# Patient Record
Sex: MALE | Race: BLACK OR AFRICAN AMERICAN | URBAN - METROPOLITAN AREA
[De-identification: names, ages, dates, MRNs, and addresses within clinical notes are randomized per-mention and may not be internally consistent; named-entity substitution may affect disease eponyms.]

---

## 2017-02-09 DIAGNOSIS — I10 ESSENTIAL HYPERTENSION WITH GOAL BLOOD PRESSURE LESS THAN 130/80: ICD-10-CM

## 2017-02-09 RX ORDER — AMLODIPINE BESYLATE 2.5 MG/1
TABLET ORAL
Qty: 90 TAB | Refills: 0 | Status: SHIPPED | OUTPATIENT
Start: 2017-02-09 | End: 2017-05-28 | Stop reason: SDUPTHER

## 2017-04-12 DIAGNOSIS — I10 ESSENTIAL HYPERTENSION WITH GOAL BLOOD PRESSURE LESS THAN 130/80: ICD-10-CM

## 2017-04-13 RX ORDER — LISINOPRIL AND HYDROCHLOROTHIAZIDE 12.5; 2 MG/1; MG/1
2 TABLET ORAL 2 TIMES DAILY
Qty: 180 TAB | Refills: 0 | Status: SHIPPED | OUTPATIENT
Start: 2017-04-13 | End: 2017-07-31 | Stop reason: CLARIF

## 2017-07-31 ENCOUNTER — OFFICE VISIT (OUTPATIENT)
Dept: INTERNAL MEDICINE CLINIC | Age: 47
End: 2017-07-31

## 2017-07-31 VITALS
WEIGHT: 225.13 LBS | HEIGHT: 70 IN | SYSTOLIC BLOOD PRESSURE: 131 MMHG | BODY MASS INDEX: 32.23 KG/M2 | TEMPERATURE: 98.3 F | RESPIRATION RATE: 18 BRPM | DIASTOLIC BLOOD PRESSURE: 87 MMHG | OXYGEN SATURATION: 98 % | HEART RATE: 65 BPM

## 2017-07-31 DIAGNOSIS — R73.9 ELEVATED BLOOD SUGAR: Primary | ICD-10-CM

## 2017-07-31 DIAGNOSIS — E78.00 ELEVATED CHOLESTEROL: ICD-10-CM

## 2017-07-31 DIAGNOSIS — Z12.5 PROSTATE CANCER SCREENING: ICD-10-CM

## 2017-07-31 DIAGNOSIS — K64.0 FIRST DEGREE HEMORRHOIDS: ICD-10-CM

## 2017-07-31 DIAGNOSIS — I10 ESSENTIAL HYPERTENSION: ICD-10-CM

## 2017-07-31 LAB — HBA1C MFR BLD HPLC: 5.9 %

## 2017-07-31 RX ORDER — HYDROCORTISONE 25 MG/G
CREAM TOPICAL 2 TIMES DAILY
Qty: 30 G | Refills: 3 | Status: SHIPPED | OUTPATIENT
Start: 2017-07-31 | End: 2018-08-08 | Stop reason: SDUPTHER

## 2017-07-31 RX ORDER — ROSUVASTATIN CALCIUM 5 MG/1
5 TABLET, COATED ORAL
Qty: 30 TAB | Refills: 2 | Status: SHIPPED | OUTPATIENT
Start: 2017-07-31 | End: 2017-10-28 | Stop reason: SDUPTHER

## 2017-07-31 RX ORDER — LISINOPRIL AND HYDROCHLOROTHIAZIDE 10; 12.5 MG/1; MG/1
2 TABLET ORAL DAILY
Qty: 60 TAB | Refills: 5 | Status: SHIPPED | OUTPATIENT
Start: 2017-07-31 | End: 2017-09-25 | Stop reason: ALTCHOICE

## 2017-07-31 RX ORDER — LISINOPRIL AND HYDROCHLOROTHIAZIDE 10; 12.5 MG/1; MG/1
2 TABLET ORAL DAILY
COMMUNITY
End: 2017-07-31 | Stop reason: SDUPTHER

## 2017-07-31 NOTE — PROGRESS NOTES
HISTORY OF PRESENT ILLNESS  Sotero Andersen is a 51 y.o. male. HPI  Hypertension:  Sotero Andersen is a 51 y.o. male with hypertension. without Chronic kidney disease stage    Medication change since last visit: No  The patient reports:  taking medications as instructed, no medication side effects noted, patient does not perform home BP monitoring, no chest pain on exertion, no dyspnea on exertion, no swelling of ankles, no orthostatic dizziness or lightheadedness, no palpitations. Lifestyle modification/social history: generally follows a low fat low cholesterol diet, generally follows a low sodium diet, exercises sporadically, nonsmoker    Lab Results   Component Value Date/Time    Sodium 139 08/19/2016 08:40 AM    Potassium 3.8 08/19/2016 08:40 AM    Chloride 96 08/19/2016 08:40 AM    CO2 26 08/19/2016 08:40 AM    Glucose 89 08/19/2016 08:40 AM    BUN 11 08/19/2016 08:40 AM    Creatinine 1.01 08/19/2016 08:40 AM    BUN/Creatinine ratio 11 08/19/2016 08:40 AM    GFR est  08/19/2016 08:40 AM    GFR est non-AA 89 08/19/2016 08:40 AM    Calcium 9.4 08/19/2016 08:40 AM         Hyperlipidemia:  Sotero Andersen is following up on his dyslipidemia. Cardiovascular risks for him are: LDL goal is under 100  hypertension. Currently he takes  , nothing. He stopped crestor due to low back pain clearly related.   Lab Results   Component Value Date/Time    Cholesterol, total 228 08/19/2016 08:40 AM    Cholesterol, total 135 10/28/2015 08:30 AM    Cholesterol, total 171 02/10/2015 09:01 AM    Cholesterol, total 165 03/10/2014 08:23 AM    Cholesterol, total 200 09/09/2013 08:17 AM    HDL Cholesterol 32 08/19/2016 08:40 AM    HDL Cholesterol 33 10/28/2015 08:30 AM    HDL Cholesterol 33 02/10/2015 09:01 AM    HDL Cholesterol 35 03/10/2014 08:23 AM    HDL Cholesterol 34 09/09/2013 08:17 AM    LDL, calculated 165 08/19/2016 08:40 AM    LDL, calculated 85 10/28/2015 08:30 AM    LDL, calculated 115 02/10/2015 09:01 AM    LDL, calculated 112 03/10/2014 08:23 AM    LDL, calculated 139 09/09/2013 08:17 AM    Triglyceride 154 08/19/2016 08:40 AM    Triglyceride 87 10/28/2015 08:30 AM    Triglyceride 115 02/10/2015 09:01 AM    Triglyceride 88 03/10/2014 08:23 AM    Triglyceride 135 09/09/2013 08:17 AM     Lab Results   Component Value Date/Time    ALT (SGPT) 23 11/06/2014 11:51 AM    AST (SGOT) 19 11/06/2014 11:51 AM    Alk. phosphatase 89 11/06/2014 11:51 AM    Bilirubin, total <0.2 11/06/2014 11:51 AM             Prediabetes:  Daniel John is here for follow up of elevated fasting sugars and prediabetes. he has been counseled before on low carb/ low concentrated sweets diet and is following that plan. further diabetic ROS: no polyuria or polydipsia, no chest pain, dyspnea or TIAs, no numbness, tingling or pain in extremities . Lab Results   Component Value Date/Time    Glucose 89 08/19/2016 08:40 AM     Lab Results   Component Value Date/Time    Hemoglobin A1c 6.5 08/11/2015 09:00 AM    Hemoglobin A1c (POC) 5.9 07/31/2017 08:20 AM     Last Point of Care HGB A1C  Hemoglobin A1c (POC)   Date Value Ref Range Status   07/31/2017 5.9 % Final        ROS    Physical Exam   Constitutional: He appears well-developed and well-nourished. No distress. /87 (BP 1 Location: Left arm, BP Patient Position: Sitting)  Pulse 65  Temp 98.3 °F (36.8 °C) (Oral)   Resp 18  Ht 5' 10\" (1.778 m)  Wt 225 lb 2 oz (102.1 kg)  SpO2 98%  BMI 32.3 kg/m2Body mass index is 32.3 kg/(m^2). HENT:   Mouth/Throat: Oropharynx is clear and moist.   Neck: No JVD present. Carotid bruit is not present. Cardiovascular: Normal rate, regular rhythm, normal heart sounds and intact distal pulses. Pulmonary/Chest: Effort normal and breath sounds normal.   Musculoskeletal: He exhibits no edema. Neurological: He is alert. Skin: Skin is warm and dry. He is not diaphoretic. Nursing note and vitals reviewed.       ASSESSMENT and PLAN  Diagnoses and all orders for this visit:    1. Elevated blood sugar - low carb diet. Good activity. -     AMB POC HEMOGLOBIN A1C    2. Essential hypertension - Well controlled and stable. his medications were reviewed and refilled where necessary as noted below. Labs ordered as noted. -     METABOLIC PANEL, BASIC  -     lisinopril-hydroCHLOROthiazide (PRINZIDE, ZESTORETIC) 10-12.5 mg per tablet; Take 2 Tabs by mouth daily. 3. First degree hemorrhoids - needs refill on HC cream  -     hydrocortisone (HYTONE) 2.5 % topical cream; Apply  to affected area two (2) times a day. use thin layer    4. Prostate cancer screening  -     PROSTATE SPECIFIC AG    5. Elevated cholesterol - try lower dose crestor. Recheck in 2 months if tolerated. -     rosuvastatin (CRESTOR) 5 mg tablet; Take 1 Tab by mouth nightly. Follow-up Disposition:  Return in about 2 months (around 9/30/2017).

## 2017-07-31 NOTE — MR AVS SNAPSHOT
Visit Information Date & Time Provider Department Dept. Phone Encounter #  
 7/31/2017  8:15 AM Renny Blair MD [facility] [address] [id number] Follow-up Instructions Return in about 2 months (around 9/30/2017). Upcoming Health Maintenance Date Due INFLUENZA AGE 9 TO ADULT 8/1/2017 DTaP/Tdap/Td series (2 - Td) 8/8/2019 Allergies as of 7/31/2017  Review Complete On: 7/31/2017 By: Renny Blair MD  
  
 Severity Noted Reaction Type Reactions Simvastatin  11/04/2011    Myalgia Arthralgia Current Immunizations  Reviewed on 4/6/2016 Name Date Hep B Vaccine (Adult) 5/9/2016, 4/2/2016 Tdap 8/8/2009 Not reviewed this visit You Were Diagnosed With   
  
 Codes Comments Elevated blood sugar    -  Primary ICD-10-CM: R73.9 ICD-9-CM: 790.29 Essential hypertension     ICD-10-CM: I10 
ICD-9-CM: 401.9 First degree hemorrhoids     ICD-10-CM: K64.0 ICD-9-CM: 455.6 Prostate cancer screening     ICD-10-CM: Z12.5 ICD-9-CM: V76.44 Elevated cholesterol     ICD-10-CM: E78.00 ICD-9-CM: 272.0 Vitals BP Pulse Temp Resp Height(growth percentile) Weight(growth percentile) 131/87 (BP 1 Location: Left arm, BP Patient Position: Sitting) 65 98.3 °F (36.8 °C) (Oral) 18 5' 10\" (1.778 m) 225 lb 2 oz (102.1 kg) SpO2 BMI Smoking Status 98% 32.3 kg/m2 Never Smoker BMI and BSA Data Body Mass Index Body Surface Area  
 32.3 kg/m 2 2.25 m 2 Preferred Pharmacy Pharmacy Name Phone [id number] [place] [address] AT [facility] [phone] Your Updated Medication List  
  
   
This list is accurate as of: 7/31/17  8:41 AM.  Always use your most recent med list. amLODIPine 2.5 mg tablet Commonly known as:  Heber Rings TAKE ONE TABLET BY MOUTH ONCE DAILY  
  
 aspirin delayed-release 81 mg tablet Take 1 Tab by mouth daily. hydrocortisone 2.5 % topical cream  
Commonly known as:  HYTONE Apply  to affected area two (2) times a day. use thin layer  
  
 lisinopril-hydroCHLOROthiazide 10-12.5 mg per tablet Commonly known as:  Yohan Rom Take 2 Tabs by mouth daily. rosuvastatin 5 mg tablet Commonly known as:  CRESTOR Take 1 Tab by mouth nightly. Prescriptions Sent to Pharmacy Refills  
 rosuvastatin (CRESTOR) 5 mg tablet 2 Sig: Take 1 Tab by mouth nightly. Class: Normal  
 Pharmacy: [address] [place] [address] Ph #: [phone] Route: Oral  
 lisinopril-hydroCHLOROthiazide (PRINZIDE, ZESTORETIC) 10-12.5 mg per tablet 5 Sig: Take 2 Tabs by mouth daily. Class: Normal  
 Pharmacy: [address] [place] [address] Ph #: [phone] Route: Oral  
 hydrocortisone (HYTONE) 2.5 % topical cream 3 Sig: Apply  to affected area two (2) times a day. use thin layer Class: Normal  
 Pharmacy: [address] [place] [address] Ph #: [phone] Route: Topical  
  
We Performed the Following AMB POC HEMOGLOBIN A1C [30709 CPT(R)] METABOLIC PANEL, BASIC [81203 CPT(R)] PROSTATE SPECIFIC AG (PSA) [id number] CPT(R)] Follow-up Instructions Return in about 2 months (around 9/30/2017). Introducing [facility]! Dear Kamlesh Mandujano: Thank you for requesting a DataStax account. Our records indicate that you already have an active DataStax account. You can access your account anytime at [email or web address] Did you know that you can access your hospital and ER discharge instructions at any time in DataStax? You can also review all of your test results from your hospital stay or ER visit. Additional Information If you have questions, please visit the Frequently Asked Questions section of the QikServehart website at [email or web address] Remember, [email or web address] is NOT to be used for urgent needs. For medical emergencies, dial 911. Now available from your iPhone and Android! Please provide this summary of care documentation to your next provider. Your primary care clinician is listed as Emmett Mode. If you have any questions after today's visit, please call [phone].

## 2017-08-01 LAB
BUN SERPL-MCNC: 12 MG/DL (ref 6–24)
BUN/CREAT SERPL: 11 (ref 9–20)
CALCIUM SERPL-MCNC: 9.3 MG/DL (ref 8.7–10.2)
CHLORIDE SERPL-SCNC: 98 MMOL/L (ref 96–106)
CO2 SERPL-SCNC: 26 MMOL/L (ref 18–29)
CREAT SERPL-MCNC: 1.05 MG/DL (ref 0.76–1.27)
GLUCOSE SERPL-MCNC: 93 MG/DL (ref 65–99)
POTASSIUM SERPL-SCNC: 4.1 MMOL/L (ref 3.5–5.2)
PSA SERPL-MCNC: 0.2 NG/ML (ref 0–4)
SODIUM SERPL-SCNC: 138 MMOL/L (ref 134–144)

## 2017-08-02 DIAGNOSIS — I10 ESSENTIAL HYPERTENSION WITH GOAL BLOOD PRESSURE LESS THAN 130/80: ICD-10-CM

## 2017-08-03 RX ORDER — AMLODIPINE BESYLATE 2.5 MG/1
TABLET ORAL
Qty: 30 TAB | Refills: 0 | Status: SHIPPED | OUTPATIENT
Start: 2017-08-03 | End: 2017-09-07 | Stop reason: SDUPTHER

## 2017-09-07 DIAGNOSIS — I10 ESSENTIAL HYPERTENSION WITH GOAL BLOOD PRESSURE LESS THAN 130/80: ICD-10-CM

## 2017-09-07 RX ORDER — AMLODIPINE BESYLATE 2.5 MG/1
TABLET ORAL
Qty: 30 TAB | Refills: 0 | Status: SHIPPED | OUTPATIENT
Start: 2017-09-07 | End: 2017-10-11 | Stop reason: SDUPTHER

## 2017-09-07 RX ORDER — LISINOPRIL AND HYDROCHLOROTHIAZIDE 12.5; 2 MG/1; MG/1
TABLET ORAL
Qty: 180 TAB | Refills: 0 | Status: SHIPPED | OUTPATIENT
Start: 2017-09-07 | End: 2017-09-25 | Stop reason: SDUPTHER

## 2017-09-25 ENCOUNTER — OFFICE VISIT (OUTPATIENT)
Dept: INTERNAL MEDICINE CLINIC | Age: 47
End: 2017-09-25

## 2017-09-25 VITALS
HEIGHT: 70 IN | SYSTOLIC BLOOD PRESSURE: 127 MMHG | RESPIRATION RATE: 18 BRPM | OXYGEN SATURATION: 98 % | BODY MASS INDEX: 31.57 KG/M2 | DIASTOLIC BLOOD PRESSURE: 80 MMHG | HEART RATE: 65 BPM | TEMPERATURE: 98.4 F | WEIGHT: 220.5 LBS

## 2017-09-25 DIAGNOSIS — E78.00 ELEVATED CHOLESTEROL: Primary | ICD-10-CM

## 2017-09-25 NOTE — MR AVS SNAPSHOT
Visit Information Date & Time Provider Department Dept. Phone Encounter #  
 9/25/2017  8:00 AM Raudel Karimi MD [facility] [address] [id number] Follow-up Instructions Return in about 6 months (around 3/25/2018). Upcoming Health Maintenance Date Due INFLUENZA AGE 9 TO ADULT 8/1/2017 DTaP/Tdap/Td series (2 - Td) 8/8/2019 Allergies as of 9/25/2017  Review Complete On: 9/25/2017 By: Mari Gerber LPN Severity Noted Reaction Type Reactions Simvastatin  11/04/2011    Myalgia Arthralgia Current Immunizations  Reviewed on 4/6/2016 Name Date Hep B Vaccine (Adult) 5/9/2016, 4/2/2016 Tdap 8/8/2009 Not reviewed this visit You Were Diagnosed With   
  
 Codes Comments Elevated cholesterol    -  Primary ICD-10-CM: E78.00 ICD-9-CM: 272.0 Vitals BP Pulse Temp Resp Height(growth percentile) Weight(growth percentile) 127/80 (BP 1 Location: Left arm, BP Patient Position: Sitting) 65 98.4 °F (36.9 °C) (Oral) 18 5' 10\" (1.778 m) 220 lb 8 oz (100 kg) SpO2 BMI Smoking Status 98% 31.64 kg/m2 Never Smoker Vitals History BMI and BSA Data Body Mass Index Body Surface Area  
 31.64 kg/m 2 2.22 m 2 Preferred Pharmacy Pharmacy Name Phone [id number] [address] [facility] [phone] Your Updated Medication List  
  
   
This list is accurate as of: 9/25/17  8:13 AM.  Always use your most recent med list. amLODIPine 2.5 mg tablet Commonly known as:  Carthage Cordial TAKE ONE TABLET BY MOUTH ONCE DAILY **APPT IS REQUIRED WITHIN 30 DAYS, PLEASE CALL TO SCHEDULE**  
  
 aspirin delayed-release 81 mg tablet Take 1 Tab by mouth daily. hydrocortisone 2.5 % topical cream  
Commonly known as:  HYTONE Apply  to affected area two (2) times a day. use thin layer lisinopril-hydroCHLOROthiazide 20-12.5 mg per tablet Commonly known as:  Marlaine Yi Take 2 Tabs by mouth daily. rosuvastatin 5 mg tablet Commonly known as:  CRESTOR Take 1 Tab by mouth nightly. We Performed the Following HEPATIC FUNCTION PANEL [00513 CPT(R)] LIPID PANEL [06896 CPT(R)] Follow-up Instructions Return in about 6 months (around 3/25/2018). Introducing [facility] SERVICES! Dear Emmie Carroll: Thank you for requesting a Omedix account. Our records indicate that you already have an active Omedix account. You can access your account anytime at [email or web address] Loans [email or web address]. [email or web address]/D.[email or web address] Investments Loans & Services Did you know that you can access your hospital and ER discharge instructions at any time in Omedix? You can also review all of your test results from your hospital stay or ER visit. Additional Information If you have questions, please visit the Frequently Asked Questions section of the Omedix website at [email or web address] Loans & Services/. Remember, Omedix is NOT to be used for urgent needs. For medical emergencies, dial 911. Now available from your iPhone and Android! Please provide this summary of care documentation to your next provider. Your primary care clinician is listed as Aubrey Raines. If you have any questions after today's visit, please call [phone].

## 2017-09-25 NOTE — PROGRESS NOTES
HISTORY OF PRESENT ILLNESS  Nava Alves is a 51 y.o. male. HPI   Hyperlipidemia:  Nava Alves is following up on his dyslipidemia. Cardiovascular risks for him are: LDL goal is under 100  hypertension. Currently he takes  , crestor  Lab Results   Component Value Date/Time    Cholesterol, total 228 08/19/2016 08:40 AM    Cholesterol, total 135 10/28/2015 08:30 AM    Cholesterol, total 171 02/10/2015 09:01 AM    Cholesterol, total 165 03/10/2014 08:23 AM    Cholesterol, total 200 09/09/2013 08:17 AM    HDL Cholesterol 32 08/19/2016 08:40 AM    HDL Cholesterol 33 10/28/2015 08:30 AM    HDL Cholesterol 33 02/10/2015 09:01 AM    HDL Cholesterol 35 03/10/2014 08:23 AM    HDL Cholesterol 34 09/09/2013 08:17 AM    LDL, calculated 165 08/19/2016 08:40 AM    LDL, calculated 85 10/28/2015 08:30 AM    LDL, calculated 115 02/10/2015 09:01 AM    LDL, calculated 112 03/10/2014 08:23 AM    LDL, calculated 139 09/09/2013 08:17 AM    Triglyceride 154 08/19/2016 08:40 AM    Triglyceride 87 10/28/2015 08:30 AM    Triglyceride 115 02/10/2015 09:01 AM    Triglyceride 88 03/10/2014 08:23 AM    Triglyceride 135 09/09/2013 08:17 AM     Lab Results   Component Value Date/Time    ALT (SGPT) 23 11/06/2014 11:51 AM    AST (SGOT) 19 11/06/2014 11:51 AM    Alk. phosphatase 89 11/06/2014 11:51 AM    Bilirubin, total <0.2 11/06/2014 11:51 AM       Myalgias: no  Fatigue: no            ROS    Physical Exam  Visit Vitals    /80 (BP 1 Location: Left arm, BP Patient Position: Sitting)    Pulse 65    Temp 98.4 °F (36.9 °C) (Oral)    Resp 18    Ht 5' 10\" (1.778 m)    Wt 220 lb 8 oz (100 kg)    SpO2 98%    BMI 31.64 kg/m2     ASSESSMENT and PLAN  Diagnoses and all orders for this visit:    1. Elevated cholesterol - recheck on crestor.  -     LIPID PANEL  -     HEPATIC FUNCTION PANEL      Follow-up Disposition:  Return in about 6 months (around 3/25/2018).

## 2017-09-26 LAB
ALBUMIN SERPL-MCNC: 4.4 G/DL (ref 3.5–5.5)
ALP SERPL-CCNC: 93 IU/L (ref 39–117)
ALT SERPL-CCNC: 19 IU/L (ref 0–44)
AST SERPL-CCNC: 17 IU/L (ref 0–40)
BILIRUB DIRECT SERPL-MCNC: 0.06 MG/DL (ref 0–0.4)
BILIRUB SERPL-MCNC: 0.2 MG/DL (ref 0–1.2)
CHOLEST SERPL-MCNC: 158 MG/DL (ref 100–199)
HDLC SERPL-MCNC: 35 MG/DL
INTERPRETATION, 910389: NORMAL
LDLC SERPL CALC-MCNC: 101 MG/DL (ref 0–99)
PROT SERPL-MCNC: 7.4 G/DL (ref 6–8.5)
TRIGL SERPL-MCNC: 108 MG/DL (ref 0–149)
VLDLC SERPL CALC-MCNC: 22 MG/DL (ref 5–40)

## 2017-10-20 ENCOUNTER — TELEPHONE (OUTPATIENT)
Dept: INTERNAL MEDICINE CLINIC | Age: 47
End: 2017-10-20

## 2017-10-20 DIAGNOSIS — I10 ESSENTIAL HYPERTENSION WITH GOAL BLOOD PRESSURE LESS THAN 130/80: ICD-10-CM

## 2017-10-20 RX ORDER — AMLODIPINE BESYLATE 2.5 MG/1
2.5 TABLET ORAL DAILY
Qty: 90 TAB | Refills: 1 | Status: SHIPPED | OUTPATIENT
Start: 2017-10-20 | End: 2018-03-14 | Stop reason: SDUPTHER

## 2017-10-20 NOTE — TELEPHONE ENCOUNTER
PSR did not list what medication needs to be refilled for 90 day supply. Norvasc was most recent prescription filled. New prescription sent for a 90 day supply as prescribed.

## 2018-01-03 ENCOUNTER — OFFICE VISIT (OUTPATIENT)
Dept: INTERNAL MEDICINE CLINIC | Age: 48
End: 2018-01-03

## 2018-01-03 VITALS
SYSTOLIC BLOOD PRESSURE: 103 MMHG | TEMPERATURE: 100 F | DIASTOLIC BLOOD PRESSURE: 65 MMHG | HEIGHT: 70 IN | HEART RATE: 106 BPM | OXYGEN SATURATION: 95 % | RESPIRATION RATE: 18 BRPM | BODY MASS INDEX: 31.92 KG/M2 | WEIGHT: 223 LBS

## 2018-01-03 DIAGNOSIS — J06.9 UPPER RESPIRATORY TRACT INFECTION, UNSPECIFIED TYPE: Primary | ICD-10-CM

## 2018-01-03 RX ORDER — PROMETHAZINE HYDROCHLORIDE AND CODEINE PHOSPHATE 6.25; 1 MG/5ML; MG/5ML
5 SOLUTION ORAL
Qty: 118 ML | Refills: 0 | Status: SHIPPED | OUTPATIENT
Start: 2018-01-03 | End: 2018-03-26 | Stop reason: ALTCHOICE

## 2018-01-03 RX ORDER — BENZONATATE 100 MG/1
CAPSULE ORAL
COMMUNITY
Start: 2018-01-01 | End: 2018-09-26 | Stop reason: ALTCHOICE

## 2018-01-03 RX ORDER — GUAIFENESIN 600 MG/1
1200 TABLET, EXTENDED RELEASE ORAL 2 TIMES DAILY
Qty: 20 TAB | Refills: 0 | Status: SHIPPED | OUTPATIENT
Start: 2018-01-03 | End: 2018-01-08

## 2018-01-03 RX ORDER — AZITHROMYCIN 250 MG/1
TABLET, FILM COATED ORAL
COMMUNITY
Start: 2018-01-01 | End: 2018-09-26 | Stop reason: ALTCHOICE

## 2018-01-03 NOTE — MR AVS SNAPSHOT
Visit Information Date & Time Provider Department Dept. Phone Encounter #  
 1/3/2018 10:00 AM Antionette Lal MD [facility] [id number] Follow-up Instructions Return if symptoms worsen or fail to improve. Your Appointments 3/26/2018  8:00 AM  
ROUTINE CARE with Staci Yañez MD  
[facility] [address]) Appt Note: 6 month [address] [place] [id number] [phone]  
  
   
 [address] [facility] [address] Upcoming Health Maintenance Date Due DTaP/Tdap/Td series (2 - Td) 8/8/2019 Allergies as of 1/3/2018  Review Complete On: 4/9/6407 By: Wilbert Landry Severity Noted Reaction Type Reactions Simvastatin  11/04/2011    Myalgia Arthralgia Current Immunizations  Reviewed on 4/6/2016 Name Date Hep B Vaccine (Adult) 5/9/2016, 4/2/2016 Tdap 8/8/2009 Not reviewed this visit You Were Diagnosed With   
  
 Codes Comments Upper respiratory tract infection, unspecified type    -  Primary ICD-10-CM: J06.9 ICD-9-CM: 465.9 Vitals BP Pulse Temp Resp Height(growth percentile) Weight(growth percentile) 103/65 (BP 1 Location: Left arm, BP Patient Position: Sitting) (!) 106 100 °F (37.8 °C) (Oral) 18 5' 10\" (1.778 m) 223 lb (101.2 kg) SpO2 BMI Smoking Status 95% 32 kg/m2 Never Smoker Vitals History BMI and BSA Data Body Mass Index Body Surface Area 32 kg/m 2 2.24 m 2 Preferred Pharmacy Pharmacy Name Phone [id number] [address] [facility] [phone] Your Updated Medication List  
  
   
This list is accurate as of: 1/3/18 10:24 AM.  Always use your most recent med list. amLODIPine 2.5 mg tablet Commonly known as:  Rachael Sutton Take 1 Tab by mouth daily. aspirin delayed-release 81 mg tablet Take 1 Tab by mouth daily. azithromycin 250 mg tablet Commonly known as:  ZITHROMAX  
  
 benzonatate 100 mg capsule Commonly known as:  TESSALON  
  
 guaiFENesin  mg ER tablet Commonly known as:  Jose De Jesus & Jose De Jesus Take 2 Tabs by mouth two (2) times a day for 5 days. hydrocortisone 2.5 % topical cream  
Commonly known as:  HYTONE Apply  to affected area two (2) times a day. use thin layer * lisinopril-hydroCHLOROthiazide 20-12.5 mg per tablet Commonly known as:  Lenny Catherine Take 2 Tabs by mouth daily. * lisinopril-hydroCHLOROthiazide 20-12.5 mg per tablet Commonly known as:  Lenny Catherine Take 2 Tabs by mouth daily. promethazine-codeine 6.25-10 mg/5 mL syrup Commonly known as:  PHENERGAN with CODEINE Take 5 mL by mouth every six (6) hours as needed for Cough. Max Daily Amount: 20 mL. rosuvastatin 5 mg tablet Commonly known as:  CRESTOR  
TAKE 1 TABLET BY MOUTH NIGHTLY. * Notice: This list has 2 medication(s) that are the same as other medications prescribed for you. Read the directions carefully, and ask your doctor or other care provider to review them with you. Prescriptions Printed Refills  
 promethazine-codeine (PHENERGAN WITH CODEINE) 6.25-10 mg/5 mL syrup 0 Sig: Take 5 mL by mouth every six (6) hours as needed for Cough. Max Daily Amount: 20 mL. Class: Print Route: Oral  
  
Prescriptions Sent to Pharmacy Refills  
 guaiFENesin ER (MUCINEX) 600 mg ER tablet 0 Sig: Take 2 Tabs by mouth two (2) times a day for 5 days. Class: Normal  
 Pharmacy: [address] Ph #: [phone] Route: Oral  
  
Follow-up Instructions Return if symptoms worsen or fail to improve. Introducing \[facility]\"" & [facility] SERVICES! Dear Ghada Marrero: Thank you for requesting a Action Enginet account.   Our records indicate that you already have an active Travelata account. You can access your account anytime at [email or web address] Did you know that you can access your hospital and ER discharge instructions at any time in Travelata? You can also review all of your test results from your hospital stay or ER visit. Additional Information If you have questions, please visit the Frequently Asked Questions section of the Travelata website at [email or web address] Remember, Travelata is NOT to be used for urgent needs. For medical emergencies, dial 911. Now available from your iPhone and Android! Please provide this summary of care documentation to your next provider. Your primary care clinician is listed as Michelle Villegas. If you have any questions after today's visit, please call [phone].

## 2018-01-03 NOTE — PROGRESS NOTES
Pt went to [facility] on Monday Jan 1 for cold symptoms. Pt was told he has a chest cold and was put on Azithromycin and Benzonatate.

## 2018-01-03 NOTE — PROGRESS NOTES
Valarie Segura is a 49 y.o. male who presents today for Hospital Follow Up  . He has a history of   Patient Active Problem List   Diagnosis Code    PUD (peptic ulcer disease) K27.9    Elevated cholesterol E78.00    Hemorrhoids K64.9    Prediabetes R73.03    Family history of colonic polyps Z83.71    Microcytic anemia D50.9    Essential hypertension I10   . Today patient is here for f/u. Upper respiratory illness:  Valarie Segura presents with complaints of dry cough and hoarseness for 1 months. no nausea and no vomiting . On Sunday, began feeling more congested. Pressure on his chest. No changes with activity. Having a sore throat. Noted some fevers/chill. [facility]en at [facility] on 1/1 and placed on macrolide and tessalon Perles. Since notes that his chest congestion is better. No more pressure. Still having some ashiness. Still having some fevers chills. Still having a bad cough, worse at nigh. Not sleeping well. ROS  Review of Systems   Constitutional: Positive for chills, fever and malaise/fatigue. Negative for diaphoresis and weight loss. HENT: Positive for congestion, ear discharge, sinus pain and sore throat. Negative for ear pain, hearing loss, nosebleeds and tinnitus. Eyes: Negative for blurred vision, double vision and photophobia. Respiratory: Positive for cough and sputum production. Negative for hemoptysis, shortness of breath, wheezing and stridor. Cardiovascular: Negative for chest pain, palpitations, orthopnea, claudication and leg swelling. Gastrointestinal: Negative for abdominal pain, constipation, diarrhea, heartburn, nausea and vomiting. Genitourinary: Negative for dysuria, frequency and urgency. Musculoskeletal: Positive for myalgias. Negative for neck pain. Skin: Negative for itching and rash. Neurological: Negative for weakness. Psychiatric/Behavioral: Negative.         Visit Vitals    /65 (BP 1 Location: Left arm, BP Patient Position: Sitting)    Pulse (!) 106    Temp 100 °F (37.8 °C) (Oral)    Resp 18    Ht 5' 10\" (1.778 m)    Wt 223 lb (101.2 kg)    SpO2 95%    BMI 32 kg/m2       Physical Exam   Constitutional: He is oriented to person, place, and time. He appears well-developed and well-nourished. HENT:   Head: Normocephalic and atraumatic. Eyes: EOM are normal. Pupils are equal, round, and reactive to light. Cardiovascular: Normal rate and regular rhythm. No murmur heard. Pulmonary/Chest: Effort normal and breath sounds normal. No respiratory distress. He has no wheezes. He has no rales. Upper airway congestive symptoms, no egophony. Musculoskeletal: Normal range of motion. He exhibits no edema. Neurological: He is alert and oriented to person, place, and time. Skin: Skin is warm and dry. He is not diaphoretic. Psychiatric: He has a normal mood and affect. His behavior is normal.         Current Outpatient Prescriptions   Medication Sig    azithromycin (ZITHROMAX) 250 mg tablet     benzonatate (TESSALON) 100 mg capsule     promethazine-codeine (PHENERGAN WITH CODEINE) 6.25-10 mg/5 mL syrup Take 5 mL by mouth every six (6) hours as needed for Cough. Max Daily Amount: 20 mL.  guaiFENesin ER (MUCINEX) 600 mg ER tablet Take 2 Tabs by mouth two (2) times a day for 5 days.  rosuvastatin (CRESTOR) 5 mg tablet TAKE 1 TABLET BY MOUTH NIGHTLY.  amLODIPine (NORVASC) 2.5 mg tablet Take 1 Tab by mouth daily.  lisinopril-hydroCHLOROthiazide (PRINZIDE, ZESTORETIC) 20-12.5 mg per tablet Take 2 Tabs by mouth daily.  lisinopril-hydroCHLOROthiazide (PRINZIDE, ZESTORETIC) 20-12.5 mg per tablet Take 2 Tabs by mouth daily.  hydrocortisone (HYTONE) 2.5 % topical cream Apply  to affected area two (2) times a day. use thin layer    aspirin delayed-release 81 mg tablet Take 1 Tab by mouth daily. No current facility-administered medications for this visit.          Past Medical History:   Diagnosis Date    Hemorrhoids     Hypercholesterolemia     Hypertension     PUD (peptic ulcer disease) 2000    treated for H. pylori      History reviewed. No pertinent surgical history. Social History   Substance Use Topics    Smoking status: Never Smoker    Smokeless tobacco: Never Used    Alcohol use No      Family History   Problem Relation Age of Onset    Hypertension Mother     Colon Polyps Mother     Hypertension Father     Diabetes Father     Colon Polyps Sister     Cancer Neg Hx         Allergies   Allergen Reactions    Simvastatin Myalgia     Arthralgia        Assessment/Plan  Diagnoses and all orders for this visit:    1. Upper respiratory tract infection, unspecified type - continue abx. PRN codeine at night. BID mucinex. Rest and hydration. Low concern for ischemia. -     promethazine-codeine (PHENERGAN WITH CODEINE) 6.25-10 mg/5 mL syrup; Take 5 mL by mouth every six (6) hours as needed for Cough. Max Daily Amount: 20 mL. -     guaiFENesin ER (MUCINEX) 600 mg ER tablet; Take 2 Tabs by mouth two (2) times a day for 5 days. Follow-up Disposition:  Return if symptoms worsen or fail to improve.     Eden Schultz MD  1/3/2018

## 2018-01-05 ENCOUNTER — TELEPHONE (OUTPATIENT)
Dept: INTERNAL MEDICINE CLINIC | Age: 48
End: 2018-01-05

## 2018-01-09 DIAGNOSIS — J06.9 UPPER RESPIRATORY TRACT INFECTION, UNSPECIFIED TYPE: Primary | ICD-10-CM

## 2018-01-09 RX ORDER — AMOXICILLIN AND CLAVULANATE POTASSIUM 875; 125 MG/1; MG/1
1 TABLET, FILM COATED ORAL EVERY 12 HOURS
Qty: 20 TAB | Refills: 0 | Status: SHIPPED | OUTPATIENT
Start: 2018-01-09 | End: 2018-01-19

## 2018-03-02 ENCOUNTER — OFFICE VISIT (OUTPATIENT)
Dept: INTERNAL MEDICINE CLINIC | Age: 48
End: 2018-03-02

## 2018-03-02 VITALS
SYSTOLIC BLOOD PRESSURE: 131 MMHG | HEIGHT: 70 IN | WEIGHT: 224 LBS | HEART RATE: 77 BPM | TEMPERATURE: 98.5 F | RESPIRATION RATE: 12 BRPM | DIASTOLIC BLOOD PRESSURE: 88 MMHG | BODY MASS INDEX: 32.07 KG/M2

## 2018-03-02 DIAGNOSIS — M77.12 LATERAL EPICONDYLITIS, LEFT ELBOW: Primary | ICD-10-CM

## 2018-03-02 RX ORDER — DICLOFENAC SODIUM 75 MG/1
75 TABLET, DELAYED RELEASE ORAL 2 TIMES DAILY
Qty: 30 TAB | Refills: 0 | Status: SHIPPED | OUTPATIENT
Start: 2018-03-02 | End: 2018-03-14 | Stop reason: SDUPTHER

## 2018-03-02 NOTE — PATIENT INSTRUCTIONS
Tennis Elbow: Care Instructions  Your Care Instructions    Tennis elbow is soreness or pain on the outer part of the elbow. The pain occurs when the tendon is stretched and becomes irritated by repeated twisting of the hand, wrist, and forearm. A tendon is a tough tissue that connects muscle to bone. This injury is common in tennis players. But you also can get it from many activities that work the same muscles. Examples include gardening, painting, and using tools. Tennis elbow usually heals with rest and treatment at home. Follow-up care is a key part of your treatment and safety. Be sure to make and go to all appointments, and call your doctor if you are having problems. It's also a good idea to know your test results and keep a list of the medicines you take. How can you care for yourself at home? ? · Rest your fingers, wrist, and forearm. Try to stop or reduce any activity that causes elbow pain. You may have to rest your arm for weeks to months. Follow your doctor's directions for how long to rest.   ? · Put ice or a cold pack on your elbow for 10 to 20 minutes at a time. Try to do this every 1 to 2 hours for the next 3 days (when you are awake) or until the swelling goes down. Put a thin cloth between the ice and your skin. ? · If your doctor gave you a brace or splint, use it as directed. A \"counterforce\" brace is a strap around your forearm, just below your elbow. It may ease the pressure on the tendon and spread force throughout your arm. ? · Prop up your elbow on pillows to help reduce swelling. ? · Follow your doctor's or physical therapist's directions for exercise. ? · Return to your usual activities slowly. ? · Try to prevent the problem. Learn the best techniques for your sport. For example, make sure the  on your tennis racquet is not too big for your hand. Try not to hit a tennis ball late in your swing.    ? · Think about asking your employer about new ways of doing your job if your elbow pain is caused by something you do at work. Medicines  ? · Be safe with medicines. Read and follow all instructions on the label. ¨ If the doctor gave you a prescription medicine for pain, take it as prescribed. ¨ If you are not taking a prescription pain medicine, ask your doctor if you can take an over-the-counter medicine. When should you call for help? Call your doctor now or seek immediate medical care if:  ? · Your pain is worse. ? · You cannot bend your elbow normally. ? · Your arm or hand is cool or pale or changes color. ? · You have tingling, weakness, or numbness in your hand and fingers. ? Watch closely for changes in your health, and be sure to contact your doctor if:  ? · You have work problems caused by your elbow pain. ? · Your pain is not better after 2 weeks. Where can you learn more? Go to http://narayan-apoorva.info/. Enter 0699 465 17 [phone] in the search box to learn more about \"Tennis Elbow: Care Instructions. \"  Current as of: March 21, 2017  Content Version: 11.4  © 4438-3523 [employer]. Care instructions adapted under license by Poppin (which disclaims liability or warranty for this information). If you have questions about a medical condition or this instruction, always ask your healthcare professional. [place] [address] any warranty or liability for your use of this information. Tennis Elbow: Exercises  Your Care Instructions  Here are some examples of typical rehabilitation exercises for your condition. Start each exercise slowly. Ease off the exercise if you start to have pain. Your doctor or physical therapist will tell you when you can start these exercises and which ones will work best for you. How to do the exercises  Wrist flexor stretch    1. Extend your arm in front of you with your palm up. 2. Bend your wrist, pointing your hand toward the floor.   3. With your other hand, gently bend your wrist farther until you feel a mild to moderate stretch in your forearm. 4. Hold for at least 15 to 30 seconds. Repeat 2 to 4 times. Wrist extensor stretch    1. Repeat steps 1 to 4 of the stretch above but begin with your extended hand palm down. Ball or sock squeeze    1. Hold a tennis ball (or a rolled-up sock) in your hand. 2. Make a fist around the ball (or sock) and squeeze. 3. Hold for about 6 seconds, and then relax for up to 10 seconds. 4. Repeat 8 to 12 times. 5. Switch the ball (or sock) to your other hand and do 8 to 12 times. Wrist deviation    1. Sit so that your arm is supported but your hand hangs off the edge of a flat surface, such as a table. 2. Hold your hand out like you are shaking hands with someone. 3. Move your hand up and down. 4. Repeat this motion 8 to 12 times. 5. Switch arms. 6. Try to do this exercise twice with each hand. Wrist curls    1. Place your forearm on a table with your hand hanging over the edge of the table, palm up. 2. Place a 1- to 2-pound weight in your hand. This may be a dumbbell, a can of food, or a filled water bottle. 3. Slowly raise and lower the weight while keeping your forearm on the table and your palm facing up. 4. Repeat this motion 8 to 12 times. 5. Switch arms, and do steps 1 through 4.  6. Repeat with your hand facing down toward the floor. Switch arms. Biceps curls    1. Sit leaning forward with your legs slightly spread and your left hand on your left thigh. 2. Place your right elbow on your right thigh, and hold the weight with your forearm horizontal.  3. Slowly curl the weight up and toward your chest.  4. Repeat this motion 8 to 12 times. 5. Switch arms, and do steps 1 through 4. Follow-up care is a key part of your treatment and safety. Be sure to make and go to all appointments, and call your doctor if you are having problems. It's also a good idea to know your test results and keep a list of the medicines you take.   Where can you learn more? Go to http://narayan-apoorva.info/. Enter S921 in the search box to learn more about \"Tennis Elbow: Exercises. \"  Current as of: March 21, 2017  Content Version: 11.4  © 1543-6269 [employer]. Care instructions adapted under license by Idea[employer]ffer (which disclaims liability or warranty for this information). If you have questions about a medical condition or this instruction, always ask your healthcare professional. [place] [address] any warranty or liability for your use of this information.

## 2018-03-02 NOTE — PROGRESS NOTES
HISTORY OF PRESENT ILLNESS  Omar Poole is a 49 y.o. male. HPI  Problem visit:  Omar Poole is here for complaint of L elbow pain. Problem began 2 week(s) ago. Severity is moderate  Character of problem: sharp pain radiating from elbow down forearm to wrist.  Wakes him from sleep sometimes. Lifting , pulling on hose makes the problem worse. rest makes the problem better. Associated symptoms include: no injury, no swelling,  Treatments tried include: aleve not helpful      ROS    Physical Exam   Musculoskeletal:        Left elbow: He exhibits normal range of motion, no swelling, no effusion and no deformity. Tenderness found. Lateral epicondyle tenderness noted. No radial head, no medial epicondyle and no olecranon process tenderness noted. Visit Vitals    /88 (BP 1 Location: Left arm, BP Patient Position: Sitting)    Pulse 77    Temp 98.5 °F (36.9 °C)    Resp 12    Ht 5' 10\" (1.778 m)    Wt 224 lb (101.6 kg)    BMI 32.14 kg/m2       ASSESSMENT and PLAN  Diagnoses and all orders for this visit:    1. Lateral epicondylitis, left elbow  -     diclofenac EC (VOLTAREN) 75 mg EC tablet; Take 1 Tab by mouth two (2) times a day. Rest, ice massage, counterforce velcro brace, stretching/ strengthening exercises all explained. The patient was given written instructions detailing his diagnoses and recommendations made today at the visit. Follow-up Disposition:  Return if symptoms worsen or fail to improve.

## 2018-03-02 NOTE — MR AVS SNAPSHOT
[address] [facility] 
 
 
 [address] [place] [facility] [address] [facility] 
[phone] Patient: Karen Salinas. MRN: [id number] :1970 Visit Information Date & Time Provider Department Dept. Phone Encounter #  
 3/2/2018  8:30 AM Clarence Hernandez MD [facility] [address] [place] [address] [id number] Follow-up Instructions Return if symptoms worsen or fail to improve. Your Appointments 3/26/2018  8:00 AM  
ROUTINE CARE with Clarence Hernandez MD  
[facility] Appt Note: 6 month [address] [facility] [place] [id number]  
[phone]  
  
   
 [address] [facility] [id number] Upcoming Health Maintenance Date Due DTaP/Tdap/Td series (2 - Td) 2019 Allergies as of 3/2/2018  Review Complete On: 3/2/2018 By: Ale Mcnair Severity Noted Reaction Type Reactions Simvastatin  2011    Myalgia Arthralgia Current Immunizations  Reviewed on 2016 Name Date Hep B Vaccine (Adult) 2016, 2016 Tdap 2009 Not reviewed this visit You Were Diagnosed With   
  
 Codes Comments Lateral epicondylitis, left elbow    -  Primary ICD-10-CM: M77.12 
ICD-9-CM: 726.32 Vitals BP Pulse Temp Resp Height(growth percentile) Weight(growth percentile) 131/88 (BP 1 Location: Left arm, BP Patient Position: Sitting) 77 98.5 °F (36.9 °C) 12 5' 10\" (1.778 m) 224 lb (101.6 kg) BMI Smoking Status 32.14 kg/m2 Never Smoker Vitals History BMI and BSA Data Body Mass Index Body Surface Area  
 32.14 kg/m 2 2.24 m 2 Preferred Pharmacy Pharmacy Name Phone [id number] [address] [facility] [place] [phone] Your Updated Medication List  
  
   
This list is accurate as of 3/2/18  8:58 AM.  Always use your most recent med list.  
  
  
 amLODIPine 2.5 mg tablet Commonly known as:  Mary Sanchez Take 1 Tab by mouth daily. aspirin delayed-release 81 mg tablet Take 1 Tab by mouth daily. azithromycin 250 mg tablet Commonly known as:  ZITHROMAX  
  
 benzonatate 100 mg capsule Commonly known as:  TESSALON  
  
 diclofenac EC 75 mg EC tablet Commonly known as:  VOLTAREN Take 1 Tab by mouth two (2) times a day. hydrocortisone 2.5 % topical cream  
Commonly known as:  HYTONE Apply  to affected area two (2) times a day. use thin layer * lisinopril-hydroCHLOROthiazide 20-12.5 mg per tablet Commonly known as:  Hildred Saint Take 2 Tabs by mouth daily. * lisinopril-hydroCHLOROthiazide 20-12.5 mg per tablet Commonly known as:  Hildred Saint Take 2 Tabs by mouth daily. promethazine-codeine 6.25-10 mg/5 mL syrup Commonly known as:  PHENERGAN with CODEINE Take 5 mL by mouth every six (6) hours as needed for Cough. Max Daily Amount: 20 mL. rosuvastatin 5 mg tablet Commonly known as:  CRESTOR  
TAKE 1 TABLET BY MOUTH NIGHTLY. * Notice: This list has 2 medication(s) that are the same as other medications prescribed for you. Read the directions carefully, and ask your doctor or other care provider to review them with you. Prescriptions Sent to Pharmacy Refills  
 diclofenac EC (VOLTAREN) 75 mg EC tablet 0 Sig: Take 1 Tab by mouth two (2) times a day. Class: Normal  
 Pharmacy: [address] [place] [address] Ph #: [phone] Route: Oral  
  
Follow-up Instructions Return if symptoms worsen or fail to improve. Patient Instructions Tennis Elbow: Care Instructions Your Care Instructions Tennis elbow is soreness or pain on the outer part of the elbow.  The pain occurs when the tendon is stretched and becomes irritated by repeated twisting of the hand, wrist, and forearm. A tendon is a tough tissue that connects muscle to bone. This injury is common in tennis players. But you also can get it from many activities that work the same muscles. Examples include gardening, painting, and using tools. Tennis elbow usually heals with rest and treatment at home. Follow-up care is a key part of your treatment and safety. Be sure to make and go to all appointments, and call your doctor if you are having problems. It's also a good idea to know your test results and keep a list of the medicines you take. How can you care for yourself at home? ? · Rest your fingers, wrist, and forearm. Try to stop or reduce any activity that causes elbow pain. You may have to rest your arm for weeks to months. Follow your doctor's directions for how long to rest.  
? · Put ice or a cold pack on your elbow for 10 to 20 minutes at a time. Try to do this every 1 to 2 hours for the next 3 days (when you are awake) or until the swelling goes down. Put a thin cloth between the ice and your skin. ? · If your doctor gave you a brace or splint, use it as directed. A \"counterforce\" brace is a strap around your forearm, just below your elbow. It may ease the pressure on the tendon and spread force throughout your arm. ? · Prop up your elbow on pillows to help reduce swelling. ? · Follow your doctor's or physical therapist's directions for exercise. ? · Return to your usual activities slowly. ? · Try to prevent the problem. Learn the best techniques for your sport. For example, make sure the  on your tennis racquet is not too big for your hand. Try not to hit a tennis ball late in your swing. ? · Think about asking your employer about new ways of doing your job if your elbow pain is caused by something you do at work. Medicines ? · Be safe with medicines. Read and follow all instructions on the label. ¨ If the doctor gave you a prescription medicine for pain, take it as prescribed. ¨ If you are not taking a prescription pain medicine, ask your doctor if you can take an over-the-counter medicine. When should you call for help? Call your doctor now or seek immediate medical care if: 
? · Your pain is worse. ? · You cannot bend your elbow normally. ? · Your arm or hand is cool or pale or changes color. ? · You have tingling, weakness, or numbness in your hand and fingers. ? Watch closely for changes in your health, and be sure to contact your doctor if: 
? · You have work problems caused by your elbow pain. ? · Your pain is not better after 2 weeks. Where can you learn more? Go to http://narayan-apoorva.info/. Enter 0699 465 [id number] [phone] in the search box to learn more about \"Tennis Elbow: Care Instructions. \" Current as of: March 21, 2017 Content Version: 11.4 © 4046-6978 [employer]. Care instructions adapted under license by [employer]DA [employer]rapeutics (which disclaims liability or warranty for this information). If you have questions about a medical condition or this instruction, always ask your healthcare professional. [place] [address] any warranty or liability for your use of this information. Tennis Elbow: Exercises Your Care Instructions Here are some examples of typical rehabilitation exercises for your condition. Start each exercise slowly. Ease off the exercise if you start to have pain. Your doctor or physical therapist will tell you when you can start these exercises and which ones will work best for you. How to do the exercises Wrist flexor stretch 1. Extend your arm in front of you with your palm up. 2. Bend your wrist, pointing your hand toward the floor. 3. With your other hand, gently bend your wrist farther until you feel a mild to moderate stretch in your forearm. 4. Hold for at least 15 to 30 seconds. Repeat 2 to 4 times. Wrist extensor stretch 1. Repeat steps 1 to 4 of the stretch above but begin with your extended hand palm down. Ball or sock squeeze 1. Hold a tennis ball (or a rolled-up sock) in your hand. 2. Make a fist around the ball (or sock) and squeeze. 3. Hold for about 6 seconds, and then relax for up to 10 seconds. 4. Repeat 8 to 12 times. 5. Switch the ball (or sock) to your other hand and do 8 to 12 times. Wrist deviation 1. Sit so that your arm is supported but your hand hangs off the edge of a flat surface, such as a table. 2. Hold your hand out like you are shaking hands with someone. 3. Move your hand up and down. 4. Repeat this motion 8 to 12 times. 5. Switch arms. 6. Try to do this exercise twice with each hand. Wrist curls 1. Place your forearm on a table with your hand hanging over the edge of the table, palm up. 2. Place a 1- to 2-pound weight in your hand. This may be a dumbbell, a can of food, or a filled water bottle. 3. Slowly raise and lower the weight while keeping your forearm on the table and your palm facing up. 4. Repeat this motion 8 to 12 times. 5. Switch arms, and do steps 1 through 4. 
6. Repeat with your hand facing down toward the floor. Switch arms. Biceps curls 1. Sit leaning forward with your legs slightly spread and your left hand on your left thigh. 2. Place your right elbow on your right thigh, and hold the weight with your forearm horizontal. 
3. Slowly curl the weight up and toward your chest. 
4. Repeat this motion 8 to 12 times. 5. Switch arms, and do steps 1 through 4. Follow-up care is a key part of your treatment and safety. Be sure to make and go to all appointments, and call your doctor if you are having problems. It's also a good idea to know your test results and keep a list of the medicines you take. Where can you learn more? Go to http://narayan-apoorva.info/.  
Enter S324 in the search box to learn more about \"Tennis Elbow: Exercises. \" Current as of: March 21, 2017 Content Version: 11.4 © 0078-6561 [employer]kStart. Care instructions adapted under license by Ateneo Digital (which disclaims liability or warranty for this information). If you have questions about a medical condition or this instruction, always ask your healthcare professional. [address] any warranty or liability for your use of this information. Introducing [facility] & HEALTH SERVICES! Dear Tesfaye Brownlee: Thank you for requesting a PharmaNation account. Our records indicate that you already have an active PharmaNation account. You can access your account anytime at [email or web address]. Nexus [email or web address] Did you know that you can access your hospital and ER discharge instructions at any time in PharmaNation? You can also review all of your test results from your hospital stay or ER visit. Additional Information If you have questions, please visit the Frequently Asked Questions section of the PharmaNation website at [email or web address] Remember, PharmaNation is NOT to be used for urgent needs. For medical emergencies, dial 911. Now available from your iPhone and Android! Please provide this summary of care documentation to your next provider. Your primary care clinician is listed as Macarena [place]. If you have any questions after today's visit, please call [phone].

## 2018-03-14 DIAGNOSIS — I10 ESSENTIAL HYPERTENSION WITH GOAL BLOOD PRESSURE LESS THAN 130/80: ICD-10-CM

## 2018-03-14 DIAGNOSIS — M77.12 LATERAL EPICONDYLITIS, LEFT ELBOW: ICD-10-CM

## 2018-03-14 RX ORDER — DICLOFENAC SODIUM 75 MG/1
TABLET, DELAYED RELEASE ORAL
Qty: 30 TAB | Refills: 0 | Status: SHIPPED | OUTPATIENT
Start: 2018-03-14 | End: 2018-09-26 | Stop reason: ALTCHOICE

## 2018-03-14 RX ORDER — AMLODIPINE BESYLATE 2.5 MG/1
TABLET ORAL
Qty: 90 TAB | Refills: 1 | Status: SHIPPED | OUTPATIENT
Start: 2018-03-14 | End: 2018-03-26 | Stop reason: SDUPTHER

## 2018-03-26 ENCOUNTER — OFFICE VISIT (OUTPATIENT)
Dept: INTERNAL MEDICINE CLINIC | Age: 48
End: 2018-03-26

## 2018-03-26 VITALS
DIASTOLIC BLOOD PRESSURE: 88 MMHG | HEART RATE: 72 BPM | WEIGHT: 221 LBS | TEMPERATURE: 98.6 F | BODY MASS INDEX: 31.64 KG/M2 | SYSTOLIC BLOOD PRESSURE: 136 MMHG | RESPIRATION RATE: 19 BRPM | OXYGEN SATURATION: 99 % | HEIGHT: 70 IN

## 2018-03-26 DIAGNOSIS — I10 ESSENTIAL HYPERTENSION: Primary | ICD-10-CM

## 2018-03-26 DIAGNOSIS — R73.09 ELEVATED GLUCOSE: ICD-10-CM

## 2018-03-26 DIAGNOSIS — G57.92 COMPRESSION NEUROPATHY OF LEFT LOWER EXTREMITY: ICD-10-CM

## 2018-03-26 DIAGNOSIS — E78.00 ELEVATED CHOLESTEROL: ICD-10-CM

## 2018-03-26 RX ORDER — AMLODIPINE BESYLATE 2.5 MG/1
TABLET ORAL
Qty: 90 TAB | Refills: 1 | Status: SHIPPED | OUTPATIENT
Start: 2018-03-26 | End: 2018-09-26 | Stop reason: DRUGHIGH

## 2018-03-26 NOTE — MR AVS SNAPSHOT
[address] [facility] 
 
 
 [address] [place] [address] [facility] 
[phone] Patient: Onel Dillard. MRN: [id number] :1970 Visit Information Date & Time Provider Department Dept. Phone Encounter #  
 3/26/2018  8:00 AM Elizabeth Medrano MD [facility] [address] [facility] [id number] Follow-up Instructions Return in about 6 months (around 2018). Upcoming Health Maintenance Date Due DTaP/Tdap/Td series (2 - Td) 2019 Allergies as of 3/26/2018  Review Complete On: 3/26/2018 By: Sohail Meeks LPN Severity Noted Reaction Type Reactions Simvastatin  2011    Myalgia Arthralgia Current Immunizations  Reviewed on 2016 Name Date Hep B Vaccine (Adult) 2016, 2016 Tdap 2009 Not reviewed this visit You Were Diagnosed With   
  
 Codes Comments Essential hypertension    -  Primary ICD-10-CM: I10 
ICD-9-CM: 401.9 Elevated cholesterol     ICD-10-CM: E78.00 ICD-9-CM: 272.0 Elevated glucose     ICD-10-CM: R73.09 
ICD-9-CM: 790.29 Essential hypertension with goal blood pressure less than 130/80     ICD-10-CM: I10 
ICD-9-CM: 401.9 Compression neuropathy of left lower extremity     ICD-10-CM: G57.92 
ICD-9-CM: 355.8 Vitals BP Pulse Temp Resp Height(growth percentile) Weight(growth percentile) 136/88 72 98.6 °F (37 °C) (Oral) 19 5' 10\" (1.778 m) 221 lb (100.2 kg) SpO2 BMI Smoking Status 99% 31.71 kg/m2 Never Smoker Vitals History BMI and BSA Data Body Mass Index Body Surface Area 31.71 kg/m 2 2.22 m 2 Preferred Pharmacy Pharmacy Name Phone [id number] [place] AT [facility] [phone] Your Updated Medication List  
  
   
This list is accurate as of 3/26/18  8:20 AM.  Always use your most recent med list.  
  
  
  
  
 amLODIPine 2.5 mg tablet Commonly known as:  Cassandra Guajardo TAKE 1 TAB BY MOUTH DAILY. aspirin delayed-release 81 mg tablet Take 1 Tab by mouth daily. azithromycin 250 mg tablet Commonly known as:  ZITHROMAX  
  
 benzonatate 100 mg capsule Commonly known as:  TESSALON  
  
 diclofenac EC 75 mg EC tablet Commonly known as:  VOLTAREN  
TAKE 1 TAB BY MOUTH TWO (2) TIMES A DAY. hydrocortisone 2.5 % topical cream  
Commonly known as:  HYTONE Apply  to affected area two (2) times a day. use thin layer  
  
 lisinopril-hydroCHLOROthiazide 20-12.5 mg per tablet Commonly known as:  Garima Klippel Take 2 Tabs by mouth daily. rosuvastatin 5 mg tablet Commonly known as:  CRESTOR  
TAKE 1 TABLET BY MOUTH NIGHTLY. Prescriptions Sent to Pharmacy Refills  
 amLODIPine (NORVASC) 2.5 mg tablet 1 Sig: TAKE 1 TAB BY MOUTH DAILY. Class: Normal  
 Pharmacy: [address] [place] [address] Ph #: [phone] We Performed the Following LIPID PANEL [30827 CPT(R)] METABOLIC PANEL, BASIC [62875 CPT(R)] Follow-up Instructions Return in about 6 months (around 9/26/2018). Introducing [facility] & HEALTH SERVICES! Dear Agustin Streeter: Thank you for requesting a Resilinc account. Our records indicate that you already have an active Resilinc account. You can access your account anytime at [email or web address] Did you know that you can access your hospital and ER discharge instructions at any time in Resilinc? You can also review all of your test results from your hospital stay or ER visit. Additional Information If you have questions, please visit the Frequently Asked Questions section of the Resilinc website at [email or web address] Remember, Resilinc is NOT to be used for urgent needs. For medical emergencies, dial 911. Now available from your iPhone and Android! Please provide this summary of care documentation to your next provider. Your primary care clinician is listed as Mitali [place]ail. If you have any questions after today's visit, please call [phone].

## 2018-03-27 LAB
BUN SERPL-MCNC: 11 MG/DL (ref 6–24)
BUN/CREAT SERPL: 13 (ref 9–20)
CALCIUM SERPL-MCNC: 9.4 MG/DL (ref 8.7–10.2)
CHLORIDE SERPL-SCNC: 98 MMOL/L (ref 96–106)
CHOLEST SERPL-MCNC: 250 MG/DL (ref 100–199)
CO2 SERPL-SCNC: 27 MMOL/L (ref 18–29)
COMMENT, 011824: ABNORMAL
CREAT SERPL-MCNC: 0.86 MG/DL (ref 0.76–1.27)
GFR SERPLBLD CREATININE-BSD FMLA CKD-EPI: 103 ML/MIN/1.73
GFR SERPLBLD CREATININE-BSD FMLA CKD-EPI: 119 ML/MIN/1.73
GLUCOSE SERPL-MCNC: 96 MG/DL (ref 65–99)
HDLC SERPL-MCNC: 35 MG/DL
INTERPRETATION, 910389: NORMAL
LDLC SERPL CALC-MCNC: 196 MG/DL (ref 0–99)
POTASSIUM SERPL-SCNC: 4 MMOL/L (ref 3.5–5.2)
SODIUM SERPL-SCNC: 140 MMOL/L (ref 134–144)
TRIGL SERPL-MCNC: 93 MG/DL (ref 0–149)
VLDLC SERPL CALC-MCNC: 19 MG/DL (ref 5–40)

## 2018-08-08 DIAGNOSIS — K64.0 FIRST DEGREE HEMORRHOIDS: ICD-10-CM

## 2018-08-08 RX ORDER — HYDROCORTISONE 25 MG/G
CREAM TOPICAL
Qty: 28.35 G | Refills: 1 | Status: SHIPPED | OUTPATIENT
Start: 2018-08-08

## 2018-09-26 ENCOUNTER — OFFICE VISIT (OUTPATIENT)
Dept: INTERNAL MEDICINE CLINIC | Age: 48
End: 2018-09-26

## 2018-09-26 VITALS
TEMPERATURE: 98.6 F | RESPIRATION RATE: 19 BRPM | SYSTOLIC BLOOD PRESSURE: 134 MMHG | DIASTOLIC BLOOD PRESSURE: 82 MMHG | OXYGEN SATURATION: 100 % | BODY MASS INDEX: 31.07 KG/M2 | HEART RATE: 80 BPM | WEIGHT: 217 LBS | HEIGHT: 70 IN

## 2018-09-26 DIAGNOSIS — I10 ESSENTIAL HYPERTENSION: Primary | ICD-10-CM

## 2018-09-26 DIAGNOSIS — E78.00 ELEVATED CHOLESTEROL: ICD-10-CM

## 2018-09-26 RX ORDER — AMLODIPINE BESYLATE 5 MG/1
5 TABLET ORAL DAILY
Qty: 30 TAB | Refills: 5 | Status: SHIPPED | OUTPATIENT
Start: 2018-09-26 | End: 2019-02-21 | Stop reason: SDUPTHER

## 2018-09-26 RX ORDER — ROSUVASTATIN CALCIUM 5 MG/1
2.5 TABLET, COATED ORAL DAILY
Qty: 30 TAB | Refills: 5
Start: 2018-09-26

## 2018-09-26 NOTE — PROGRESS NOTES
HISTORY OF PRESENT ILLNESS  Aby Cadena is a 49 y.o. male. HPI  Hypertension:  Aby Cadena is a 49 y.o. male with hypertension. without Chronic kidney disease stage    Medication change since last visit: No  The patient reports:  taking medications as instructed, no medication side effects noted, patient does not perform home BP monitoring, no chest pain on exertion, no dyspnea on exertion, no swelling of ankles, no orthostatic dizziness or lightheadedness, no palpitations. Lifestyle modification/social history: generally follows a low fat low cholesterol diet, generally follows a low sodium diet, exercises regularly    Lab Results   Component Value Date/Time    Sodium 140 03/26/2018 08:37 AM    Potassium 4.0 03/26/2018 08:37 AM    Chloride 98 03/26/2018 08:37 AM    CO2 27 03/26/2018 08:37 AM    Glucose 96 03/26/2018 08:37 AM    BUN 11 03/26/2018 08:37 AM    Creatinine 0.86 03/26/2018 08:37 AM    BUN/Creatinine ratio 13 03/26/2018 08:37 AM    GFR est  03/26/2018 08:37 AM    GFR est non- 03/26/2018 08:37 AM    Calcium 9.4 03/26/2018 08:37 AM     Hyperlipidemia:  Aby Cadena is following up on his dyslipidemia. Cardiovascular risks for him are: LDL goal is under 130  hypertension.    Currently he takes  , nothing currently\ due to recurrent bodyaches after 3 weeks of dosing  Lab Results   Component Value Date/Time    Cholesterol, total 250 (H) 03/26/2018 08:37 AM    Cholesterol, total 158 09/25/2017 08:37 AM    Cholesterol, total 228 (H) 08/19/2016 08:40 AM    Cholesterol, total 135 10/28/2015 08:30 AM    Cholesterol, total 171 02/10/2015 09:01 AM    HDL Cholesterol 35 (L) 03/26/2018 08:37 AM    HDL Cholesterol 35 (L) 09/25/2017 08:37 AM    HDL Cholesterol 32 (L) 08/19/2016 08:40 AM    HDL Cholesterol 33 (L) 10/28/2015 08:30 AM    HDL Cholesterol 33 (L) 02/10/2015 09:01 AM    LDL, calculated 196 (H) 03/26/2018 08:37 AM    LDL, calculated 101 (H) 09/25/2017 08:37 AM    LDL, calculated 165 (H) 08/19/2016 08:40 AM    LDL, calculated 85 10/28/2015 08:30 AM    LDL, calculated 115 (H) 02/10/2015 09:01 AM    Triglyceride 93 03/26/2018 08:37 AM    Triglyceride 108 09/25/2017 08:37 AM    Triglyceride 154 (H) 08/19/2016 08:40 AM    Triglyceride 87 10/28/2015 08:30 AM    Triglyceride 115 02/10/2015 09:01 AM     Lab Results   Component Value Date/Time    ALT (SGPT) 19 09/25/2017 08:37 AM    AST (SGOT) 17 09/25/2017 08:37 AM    Alk. phosphatase 93 09/25/2017 08:37 AM    Bilirubin, direct 0.06 09/25/2017 08:37 AM    Bilirubin, total 0.2 09/25/2017 08:37 AM       Myalgias: yes  Fatigue: no    Patient Active Problem List   Diagnosis Code    PUD (peptic ulcer disease) K27.9    Elevated cholesterol E78.00    Hemorrhoids K64.9    Prediabetes R73.03    Family history of colonic polyps Z83.71    Microcytic anemia D50.9    Essential hypertension I10     Past Medical History:   Diagnosis Date    Hemorrhoids     Hypercholesterolemia     Hypertension     PUD (peptic ulcer disease) 2000    treated for H. pylori     Allergies   Allergen Reactions    Simvastatin Myalgia     Arthralgia     Current Outpatient Prescriptions on File Prior to Visit   Medication Sig Dispense Refill    hydrocortisone (HYTONE) 2.5 % topical cream APPLY TO AFFECTED AREA TWO (2) TIMES A DAY. USE THIN LAYER 28.35 g 1    amLODIPine (NORVASC) 2.5 mg tablet TAKE 1 TAB BY MOUTH DAILY. 90 Tab 1    rosuvastatin (CRESTOR) 5 mg tablet TAKE 1 TABLET BY MOUTH NIGHTLY. 30 Tab 5    lisinopril-hydroCHLOROthiazide (PRINZIDE, ZESTORETIC) 20-12.5 mg per tablet Take 2 Tabs by mouth daily. 180 Tab 1    aspirin delayed-release 81 mg tablet Take 1 Tab by mouth daily. No current facility-administered medications on file prior to visit.       Social History   Substance Use Topics    Smoking status: Never Smoker    Smokeless tobacco: Never Used    Alcohol use No     ]        ROS    Physical Exam   Constitutional: He appears well-developed and well-nourished. No distress. /82  Pulse 80  Temp 98.6 °F (37 °C) (Oral)   Resp 19  Ht 5' 10\" (1.778 m)  Wt 217 lb (98.4 kg)  SpO2 100%  BMI 31.14 kg/m2Body mass index is 31.14 kg/(m^2). HENT:   Mouth/Throat: Oropharynx is clear and moist.   Neck: No JVD present. Carotid bruit is not present. Cardiovascular: Normal rate, regular rhythm, normal heart sounds and intact distal pulses. Pulmonary/Chest: Effort normal and breath sounds normal.   Musculoskeletal: He exhibits no edema. Neurological: He is alert. Skin: Skin is warm and dry. He is not diaphoretic. Nursing note and vitals reviewed. ASSESSMENT and PLAN  Diagnoses and all orders for this visit:    1. Essential hypertension - almost to goal.  Increase CCB. Log blood pressures at home while sitting, relaxed 3-5 times weekly and bring to next visit. Pt educated on goal BP of 130/80 on average or lower. Call office as soon as possible if BP's over 140/90 or below 110/50 on multiple occasions and/or with symptoms of dizziness, chest pain, shortness of breath, headache or ankle swelling. Recheck log and bp here in 6 month(s). -     amLODIPine (NORVASC) 5 mg tablet; Take 1 Tab by mouth daily.  -     METABOLIC PANEL, BASIC    2. Elevated cholesterol - decrease crestor to dose which does not cause myalgias. Recheck next visit. -     rosuvastatin (CRESTOR) 5 mg tablet; Take 0.5 Tabs by mouth daily. Follow-up Disposition:  Return in about 6 months (around 3/26/2019).

## 2018-09-26 NOTE — MR AVS SNAPSHOT
[facility] 
 
 
 [address]  [place] [address] [facility] 
[phone] Patient: Rj Spicer. MRN: [id number] Visit Information Date & Time Provider Department Dept. Phone Encounter #  
 2018  8:00 AM Susy Meza MD [facility] [address] [id number] Follow-up Instructions Return in about 6 months (around 3/26/2019). Upcoming Health Maintenance Date Due Influenza Age 5 to Adult 2018 DTaP/Tdap/Td series (2 - Td) 2019 Allergies as of 2018  Review Complete On: 2018 By: Benjamin Briec LPN Severity Noted Reaction Type Reactions Simvastatin  2011    Myalgia Arthralgia Current Immunizations  Reviewed on 2016 Name Date Hep B Vaccine (Adult) 2016, 2016 Tdap 2009 Not reviewed this visit You Were Diagnosed With   
  
 Codes Comments Essential hypertension    -  Primary ICD-10-CM: I10 
ICD-9-CM: 401.9 Elevated cholesterol     ICD-10-CM: E78.00 ICD-9-CM: 272.0 Vitals BP Pulse Temp Resp Height(growth percentile) Weight(growth percentile) 134/82 80 98.6 °F (37 °C) (Oral) 19 5' 10\" (1.778 m) 217 lb (98.4 kg) SpO2 BMI Smoking Status 100% 31.14 kg/m2 Never Smoker Vitals History BMI and BSA Data Body Mass Index Body Surface Area  
 31.14 kg/m 2 2.2 m 2 Preferred Pharmacy Pharmacy Name Phone [id number] [address] [facility] [phone] Your Updated Medication List  
  
   
This list is accurate as of 18  8:22 AM.  Always use your most recent med list. amLODIPine 5 mg tablet Commonly known as:  Berto Reyeses Take 1 Tab by mouth daily. aspirin delayed-release 81 mg tablet Take 1 Tab by mouth daily.   
  
 hydrocortisone 2.5 % topical cream  
Commonly known as:  HYTONE  
 APPLY TO AFFECTED AREA TWO (2) TIMES A DAY. USE THIN LAYER  
  
 lisinopril-hydroCHLOROthiazide 20-12.5 mg per tablet Commonly known as:  Ashlee Hodgkin Take 2 Tabs by mouth daily. rosuvastatin 5 mg tablet Commonly known as:  CRESTOR Take 0.5 Tabs by mouth daily. Prescriptions Sent to Pharmacy Refills  
 amLODIPine (NORVASC) 5 mg tablet 5 Sig: Take 1 Tab by mouth daily. Class: Normal  
 Pharmacy: [address] #: [phone] Route: Oral  
  
Follow-up Instructions Return in about 6 months (around 3/26/2019). Introducing [facility]! Dear Pito Cullen: Thank you for requesting a [employer] account. Our records indicate that you already have an active [employer] account. You can access your account anytime at [email or web address]. [email or web address] Did you know that you can access your hospital and ER discharge instructions at any time in [employer]? You can also review all of your test results from your hospital stay or ER visit. Additional Information If you have questions, please visit the Frequently Asked Questions section of the [employer] website at [email or web address] Remember, [employer] is NOT to be used for urgent needs. For medical emergencies, dial 911. Now available from your iPhone and Android! Please provide this summary of care documentation to your next provider. Your primary care clinician is listed as Patricia Siegel. If you have any questions after today's visit, please call [phone].

## 2018-09-27 LAB
BUN SERPL-MCNC: 11 MG/DL (ref 6–24)
BUN/CREAT SERPL: 10 (ref 9–20)
CALCIUM SERPL-MCNC: 9.6 MG/DL (ref 8.7–10.2)
CHLORIDE SERPL-SCNC: 100 MMOL/L (ref 96–106)
CO2 SERPL-SCNC: 27 MMOL/L (ref 20–29)
CREAT SERPL-MCNC: 1.13 MG/DL (ref 0.76–1.27)
GLUCOSE SERPL-MCNC: 94 MG/DL (ref 65–99)
POTASSIUM SERPL-SCNC: 3.8 MMOL/L (ref 3.5–5.2)
SODIUM SERPL-SCNC: 142 MMOL/L (ref 134–144)

## 2018-09-29 DIAGNOSIS — I10 ESSENTIAL HYPERTENSION WITH GOAL BLOOD PRESSURE LESS THAN 130/80: ICD-10-CM

## 2018-09-29 RX ORDER — LISINOPRIL AND HYDROCHLOROTHIAZIDE 12.5; 2 MG/1; MG/1
TABLET ORAL
Qty: 180 TAB | Refills: 1 | Status: SHIPPED | OUTPATIENT
Start: 2018-09-29 | End: 2019-03-23 | Stop reason: SDUPTHER

## 2019-02-21 DIAGNOSIS — I10 ESSENTIAL HYPERTENSION: ICD-10-CM

## 2019-02-21 RX ORDER — AMLODIPINE BESYLATE 5 MG/1
TABLET ORAL
Qty: 30 TAB | Refills: 4 | Status: SHIPPED | OUTPATIENT
Start: 2019-02-21 | End: 2019-05-20 | Stop reason: SDUPTHER

## 2019-02-21 NOTE — TELEPHONE ENCOUNTER
Last visit noted, labs checked and refill deemed appropriate at this time. Verbal refill order authorized by covering physicians at [facility] for Dr. Tarsha Sales during his absence.     Gary Obrien, PharmD, BCPS, CDE

## 2019-05-13 ENCOUNTER — PATIENT MESSAGE (OUTPATIENT)
Dept: INTERNAL MEDICINE CLINIC | Age: 49
End: 2019-05-13

## 2019-05-20 DIAGNOSIS — I10 ESSENTIAL HYPERTENSION: ICD-10-CM

## 2019-05-20 RX ORDER — AMLODIPINE BESYLATE 5 MG/1
TABLET ORAL
Qty: 90 TAB | Refills: 0 | Status: SHIPPED | OUTPATIENT
Start: 2019-05-20

## 2019-05-20 NOTE — TELEPHONE ENCOUNTER
Last visit noted, labs checked and refill deemed appropriate at this time. Verbal refill order authorized by covering physicians at [facility] for Dr. Chong Villegas during his absence. [employer] message sent by NATALY Mahan, PharmD, BCPS, CDE

## 2019-07-18 ENCOUNTER — PATIENT MESSAGE (OUTPATIENT)
Dept: INTERNAL MEDICINE CLINIC | Age: 49
End: 2019-07-18

## 2020-11-21 NOTE — PROGRESS NOTES
HISTORY OF PRESENT ILLNESS  Michael Dowd is a 49 y.o. male. HPI  Hypertension:  Michael Dowd is a 49 y.o. male with hypertension. without Chronic kidney disease stage    Medication change since last visit: No  The patient reports:  taking medications as instructed, no medication side effects noted, patient does not perform home BP monitoring, no chest pain on exertion, no dyspnea on exertion, no swelling of ankles, no orthostatic dizziness or lightheadedness, no palpitations. Lifestyle modification/social history: generally follows a low fat low cholesterol diet, generally follows a low sodium diet, exercises sporadically, nonsmoker    Lab Results   Component Value Date/Time    Sodium 138 07/31/2017 08:59 AM    Potassium 4.1 07/31/2017 08:59 AM    Chloride 98 07/31/2017 08:59 AM    CO2 26 07/31/2017 08:59 AM    Glucose 93 07/31/2017 08:59 AM    BUN 12 07/31/2017 08:59 AM    Creatinine 1.05 07/31/2017 08:59 AM    BUN/Creatinine ratio 11 07/31/2017 08:59 AM    GFR est AA 97 07/31/2017 08:59 AM    GFR est non-AA 84 07/31/2017 08:59 AM    Calcium 9.3 07/31/2017 08:59 AM         Hyperlipidemia:  Jeffrey MCLAUGHLIN Chiara Garrido is following up on his dyslipidemia. Cardiovascular risks for him are: LDL goal is under 100  hypertension.    Currently he takes  , crestor    Lab Results   Component Value Date/Time    Cholesterol, total 158 09/25/2017 08:37 AM    Cholesterol, total 228 (H) 08/19/2016 08:40 AM    Cholesterol, total 135 10/28/2015 08:30 AM    Cholesterol, total 171 02/10/2015 09:01 AM    Cholesterol, total 165 03/10/2014 08:23 AM    HDL Cholesterol 35 (L) 09/25/2017 08:37 AM    HDL Cholesterol 32 (L) 08/19/2016 08:40 AM    HDL Cholesterol 33 (L) 10/28/2015 08:30 AM    HDL Cholesterol 33 (L) 02/10/2015 09:01 AM    HDL Cholesterol 35 (L) 03/10/2014 08:23 AM    LDL, calculated 101 (H) 09/25/2017 08:37 AM    LDL, calculated 165 (H) 08/19/2016 08:40 AM    LDL, calculated 85 10/28/2015 08:30 AM    LDL, calculated 115 (H) 02/10/2015 09:01 AM    LDL, calculated 112 (H) 03/10/2014 08:23 AM    Triglyceride 108 09/25/2017 08:37 AM    Triglyceride 154 (H) 08/19/2016 08:40 AM    Triglyceride 87 10/28/2015 08:30 AM    Triglyceride 115 02/10/2015 09:01 AM    Triglyceride 88 03/10/2014 08:23 AM     Lab Results   Component Value Date/Time    ALT (SGPT) 19 09/25/2017 08:37 AM    AST (SGOT) 17 09/25/2017 08:37 AM    Alk. phosphatase 93 09/25/2017 08:37 AM    Bilirubin, direct 0.06 09/25/2017 08:37 AM    Bilirubin, total 0.2 09/25/2017 08:37 AM       Myalgias: no  Fatigue: no    Prediabetes:  Jeffrey Gonzalez. is here for follow up of elevated fasting sugars and prediabetes. he has been counseled before on low carb/ low concentrated sweets diet and is following that plan. further diabetic ROS: no polyuria or polydipsia, no chest pain, dyspnea or TIAs . Lab Results   Component Value Date/Time    Glucose 93 07/31/2017 08:59 AM     Lab Results   Component Value Date/Time    Hemoglobin A1c 6.5 (H) 08/11/2015 09:00 AM    Hemoglobin A1c (POC) 5.9 07/31/2017 08:20 AM     Last Point of Care HGB A1C  Hemoglobin A1c (POC)   Date Value Ref Range Status   07/31/2017 5.9 % Final            Patient Active Problem List   Diagnosis Code    PUD (peptic ulcer disease) K27.9    Elevated cholesterol E78.00    Hemorrhoids K64.9    Prediabetes R73.03    Family history of colonic polyps Z83.71    Microcytic anemia D50.9    Essential hypertension I10     Past Medical History:   Diagnosis Date    Hemorrhoids     Hypercholesterolemia     Hypertension     PUD (peptic ulcer disease) 2000    treated for H. pylori     Allergies   Allergen Reactions    Simvastatin Myalgia     Arthralgia     Current Outpatient Prescriptions on File Prior to Visit   Medication Sig Dispense Refill    diclofenac EC (VOLTAREN) 75 mg EC tablet TAKE 1 TAB BY MOUTH TWO (2) TIMES A DAY. 30 Tab 0    rosuvastatin (CRESTOR) 5 mg tablet TAKE 1 TABLET BY MOUTH NIGHTLY. 30 Tab 5    azithromycin (ZITHROMAX) 250 mg tablet       benzonatate (TESSALON) 100 mg capsule       hydrocortisone (HYTONE) 2.5 % topical cream Apply  to affected area two (2) times a day. use thin layer 30 g 3    aspirin delayed-release 81 mg tablet Take 1 Tab by mouth daily.  lisinopril-hydroCHLOROthiazide (PRINZIDE, ZESTORETIC) 20-12.5 mg per tablet Take 2 Tabs by mouth daily. 180 Tab 1     No current facility-administered medications on file prior to visit. Social History   Substance Use Topics    Smoking status: Never Smoker    Smokeless tobacco: Never Used    Alcohol use No           Review of Systems   Neurological: Positive for sensory change (he reports waking up in am with L toe numbness until he's up and walking. no symptoms otherwise. no pain, no back pain. no weakness. ). Physical Exam   Constitutional: He appears well-developed and well-nourished. No distress. /88  Pulse 72  Temp 98.6 °F (37 °C) (Oral)   Resp 19  Ht 5' 10\" (1.778 m)  Wt 221 lb (100.2 kg)  SpO2 99%  BMI 31.71 kg/m2Body mass index is 31.71 kg/(m^2). HENT:   Mouth/Throat: Oropharynx is clear and moist.   Neck: No JVD present. Carotid bruit is not present. Cardiovascular: Normal rate, regular rhythm, normal heart sounds and intact distal pulses. Pulmonary/Chest: Effort normal and breath sounds normal.   Musculoskeletal: He exhibits no edema. Neurological: He is alert. Skin: Skin is warm and dry. He is not diaphoretic. Nursing note and vitals reviewed. ASSESSMENT and PLAN  Diagnoses and all orders for this visit:    1. Essential hypertension - fair control. ? Increased due to nsaid . Log blood pressures at home while sitting, relaxed 3-5 times weekly and bring to next visit. Pt educated on goal BP of 130/80 on average or lower.   Call office as soon as possible if BP's over 140/90 or below 110/50 on multiple occasions and/or with symptoms of dizziness, chest pain, shortness of breath, headache or ankle swelling. Recheck log and bp here in 6 month(s).    -     METABOLIC PANEL, BASIC  -     amLODIPine (NORVASC) 2.5 mg tablet; TAKE 1 TAB BY MOUTH DAILY. 2. Elevated cholesterol -recheck  -     LIPID PANEL    3. Elevated glucose - check FBS  -     METABOLIC PANEL, BASIC    4. Compression neuropathy of left lower extremity - reassurance. Call if symptoms progressing. Follow-up Disposition:  Return in about 6 months (around 9/26/2018). 0 = independent

## 2022-04-30 NOTE — TELEPHONE ENCOUNTER
Per the pharmacist his [address] is saying he can have 90 day supply on all of medication.  Call [facility] at [phone] Implemented All Fall with Harm Risk Interventions:  [place] to call system. Call bell, personal items and telephone within reach. Instruct patient to call for assistance. Room bathroom lighting operational. Non-slip footwear when patient is off stretcher. Physically safe environment: no spills, clutter or unnecessary equipment. Stretcher in lowest position, wheels locked, appropriate side rails in place. Provide visual cue, wrist band, yellow gown, etc. Monitor gait and stability. Monitor for mental status changes and reorient to person, place, and time. Review medications for side effects contributing to fall risk. Reinforce activity limits and safety measures with patient and family. Provide visual clues: red socks.